# Patient Record
(demographics unavailable — no encounter records)

---

## 2019-01-27 NOTE — MM
Reason for exam: additional evaluation requested from prior study.



History:

Patient is postmenopausal.

Took hormonal contraceptives for 20 years.



Physical Findings:

Nurse did not find any significant physical abnormalities on exam.



MG 3D Diag Mammo W/Cad LEROY

Bilateral CC and MLO view(s) were taken.

The breast tissue is heterogeneously dense. This may lower the sensitivity of 

mammography.

Chronic nodularity in the left breast. No dominant lesion.





ASSESSMENT: Incomplete: need additional imaging evaluation, BI-RAD 0



RECOMMENDATION:

Ultrasound of both breasts.

Clinical management.

## 2019-01-27 NOTE — USB
History:

Patient is postmenopausal.

Took hormonal contraceptives for 20 years.



US Breast BILAT



Bilateral complete breast ultrasound includes all four quadrants, the retroareolar

region and axilla. Finding demonstrates No cystic or solid lesion seen in either 

breast.



These results were verbally communicated with the patient and result sheet given 

to the patient on 1/23/19.





ASSESSMENT: Benign, BI-RAD 2



RECOMMENDATION:

Clinical management.

Routine screening mammogram of both breasts in 1 year.